# Patient Record
Sex: FEMALE | Race: WHITE | NOT HISPANIC OR LATINO | Employment: UNEMPLOYED | URBAN - METROPOLITAN AREA
[De-identification: names, ages, dates, MRNs, and addresses within clinical notes are randomized per-mention and may not be internally consistent; named-entity substitution may affect disease eponyms.]

---

## 2017-01-01 ENCOUNTER — HOSPITAL ENCOUNTER (INPATIENT)
Facility: HOSPITAL | Age: 62
LOS: 1 days | DRG: 296 | End: 2017-05-27
Attending: EMERGENCY MEDICINE | Admitting: INTERNAL MEDICINE
Payer: MEDICAID

## 2017-01-01 VITALS
TEMPERATURE: 97 F | RESPIRATION RATE: 14 BRPM | DIASTOLIC BLOOD PRESSURE: 63 MMHG | HEART RATE: 67 BPM | OXYGEN SATURATION: 100 % | HEIGHT: 63 IN | SYSTOLIC BLOOD PRESSURE: 137 MMHG | BODY MASS INDEX: 40.86 KG/M2 | WEIGHT: 230.63 LBS

## 2017-01-01 DIAGNOSIS — J18.9 PNEUMONIA DUE TO INFECTIOUS ORGANISM, UNSPECIFIED LATERALITY, UNSPECIFIED PART OF LUNG: ICD-10-CM

## 2017-01-01 DIAGNOSIS — I46.9 CARDIAC ARREST: Primary | ICD-10-CM

## 2017-01-01 DIAGNOSIS — N17.9 AKI (ACUTE KIDNEY INJURY): ICD-10-CM

## 2017-01-01 DIAGNOSIS — J96.22 ACUTE ON CHRONIC RESPIRATORY FAILURE WITH HYPERCAPNIA: ICD-10-CM

## 2017-01-01 DIAGNOSIS — J96.00 ACUTE RESPIRATORY FAILURE, UNSPECIFIED WHETHER WITH HYPOXIA OR HYPERCAPNIA: ICD-10-CM

## 2017-01-01 DIAGNOSIS — Z45.2 ENCOUNTER FOR CENTRAL LINE PLACEMENT: ICD-10-CM

## 2017-01-01 DIAGNOSIS — Z66 DNR (DO NOT RESUSCITATE): ICD-10-CM

## 2017-01-01 DIAGNOSIS — E87.20 METABOLIC ACIDOSIS: ICD-10-CM

## 2017-01-01 DIAGNOSIS — A41.9 SEPSIS, DUE TO UNSPECIFIED ORGANISM: ICD-10-CM

## 2017-01-01 DIAGNOSIS — R57.9 SHOCK: ICD-10-CM

## 2017-01-01 LAB
ABO + RH BLD: NORMAL
ALBUMIN SERPL BCP-MCNC: 1.9 G/DL
ALBUMIN SERPL BCP-MCNC: 2 G/DL
ALLENS TEST: ABNORMAL
ALP SERPL-CCNC: 276 U/L
ALP SERPL-CCNC: 333 U/L
ALT SERPL W/O P-5'-P-CCNC: 1999 U/L
ALT SERPL W/O P-5'-P-CCNC: 2187 U/L
AMORPH CRY UR QL COMP ASSIST: ABNORMAL
AMORPH CRY UR QL COMP ASSIST: ABNORMAL
ANION GAP SERPL CALC-SCNC: 29 MMOL/L
ANION GAP SERPL CALC-SCNC: 29 MMOL/L
ANISOCYTOSIS BLD QL SMEAR: SLIGHT
ANISOCYTOSIS BLD QL SMEAR: SLIGHT
APTT BLDCRRT: 30.1 SEC
AST SERPL-CCNC: 1604 U/L
AST SERPL-CCNC: 1886 U/L
B-OH-BUTYR BLD STRIP-SCNC: 0.4 MMOL/L
BACTERIA #/AREA URNS AUTO: ABNORMAL /HPF
BACTERIA #/AREA URNS AUTO: ABNORMAL /HPF
BASOPHILS # BLD AUTO: ABNORMAL K/UL
BASOPHILS NFR BLD: 0 %
BASOPHILS NFR BLD: 0.5 %
BILIRUB SERPL-MCNC: 0.7 MG/DL
BILIRUB SERPL-MCNC: 1 MG/DL
BILIRUB UR QL STRIP: NEGATIVE
BILIRUB UR QL STRIP: NEGATIVE
BLD GP AB SCN CELLS X3 SERPL QL: NORMAL
BLD PROD TYP BPU: NORMAL
BLOOD UNIT EXPIRATION DATE: NORMAL
BLOOD UNIT TYPE CODE: 8400
BLOOD UNIT TYPE: NORMAL
BNP SERPL-MCNC: 937 PG/ML
BUN SERPL-MCNC: 55 MG/DL
BUN SERPL-MCNC: 57 MG/DL
BURR CELLS BLD QL SMEAR: ABNORMAL
BURR CELLS BLD QL SMEAR: ABNORMAL
CALCIUM SERPL-MCNC: 8.3 MG/DL
CALCIUM SERPL-MCNC: 8.4 MG/DL
CAOX CRY UR QL COMP ASSIST: ABNORMAL
CAOX CRY UR QL COMP ASSIST: ABNORMAL
CHLORIDE SERPL-SCNC: 94 MMOL/L
CHLORIDE SERPL-SCNC: 96 MMOL/L
CLARITY UR REFRACT.AUTO: ABNORMAL
CLARITY UR REFRACT.AUTO: ABNORMAL
CO2 SERPL-SCNC: 10 MMOL/L
CO2 SERPL-SCNC: 11 MMOL/L
CODING SYSTEM: NORMAL
COLOR UR AUTO: ABNORMAL
COLOR UR AUTO: ABNORMAL
CREAT SERPL-MCNC: 2.8 MG/DL
CREAT SERPL-MCNC: 2.8 MG/DL
DELSYS: ABNORMAL
DIFFERENTIAL METHOD: ABNORMAL
DIFFERENTIAL METHOD: ABNORMAL
DISPENSE STATUS: NORMAL
EOSINOPHIL # BLD AUTO: ABNORMAL K/UL
EOSINOPHIL NFR BLD: 0 %
EOSINOPHIL NFR BLD: 0 %
ERYTHROCYTE [DISTWIDTH] IN BLOOD BY AUTOMATED COUNT: 16.9 %
ERYTHROCYTE [DISTWIDTH] IN BLOOD BY AUTOMATED COUNT: 16.9 %
ERYTHROCYTE [SEDIMENTATION RATE] IN BLOOD BY WESTERGREN METHOD: 24 MM/H
EST. GFR  (AFRICAN AMERICAN): 20.4 ML/MIN/1.73 M^2
EST. GFR  (AFRICAN AMERICAN): 20.4 ML/MIN/1.73 M^2
EST. GFR  (NON AFRICAN AMERICAN): 17.7 ML/MIN/1.73 M^2
EST. GFR  (NON AFRICAN AMERICAN): 17.7 ML/MIN/1.73 M^2
ESTIMATED AVG GLUCOSE: 166 MG/DL
FIO2: 60
GLUCOSE SERPL-MCNC: 377 MG/DL
GLUCOSE SERPL-MCNC: 411 MG/DL
GLUCOSE UR QL STRIP: NEGATIVE
GLUCOSE UR QL STRIP: NEGATIVE
HBA1C MFR BLD HPLC: 7.4 %
HCO3 UR-SCNC: 13.4 MMOL/L (ref 24–28)
HCT VFR BLD AUTO: 24 %
HCT VFR BLD AUTO: 24.1 %
HCT VFR BLD AUTO: 25.9 %
HGB BLD-MCNC: 6.9 G/DL
HGB BLD-MCNC: 6.9 G/DL
HGB BLD-MCNC: 7.6 G/DL
HGB UR QL STRIP: ABNORMAL
HGB UR QL STRIP: NEGATIVE
HYALINE CASTS UR QL AUTO: 21 /LPF
HYALINE CASTS UR QL AUTO: 26 /LPF
HYPOCHROMIA BLD QL SMEAR: ABNORMAL
INR PPP: 1.8
INR PPP: 2
KETONES UR QL STRIP: NEGATIVE
KETONES UR QL STRIP: NEGATIVE
LACTATE SERPL-SCNC: >12 MMOL/L
LACTATE SERPL-SCNC: >12 MMOL/L
LEUKOCYTE ESTERASE UR QL STRIP: ABNORMAL
LEUKOCYTE ESTERASE UR QL STRIP: ABNORMAL
LIPASE SERPL-CCNC: 33 U/L
LYMPHOCYTES # BLD AUTO: ABNORMAL K/UL
LYMPHOCYTES NFR BLD: 20 %
LYMPHOCYTES NFR BLD: 6 %
MAGNESIUM SERPL-MCNC: 1.9 MG/DL
MAGNESIUM SERPL-MCNC: 2 MG/DL
MCH RBC QN AUTO: 26.4 PG
MCH RBC QN AUTO: 26.5 PG
MCHC RBC AUTO-ENTMCNC: 28.6 %
MCHC RBC AUTO-ENTMCNC: 28.8 %
MCV RBC AUTO: 92 FL
MCV RBC AUTO: 93 FL
METAMYELOCYTES NFR BLD MANUAL: 1.5 %
MICROSCOPIC COMMENT: ABNORMAL
MICROSCOPIC COMMENT: ABNORMAL
MODE: ABNORMAL
MONOCYTES # BLD AUTO: ABNORMAL K/UL
MONOCYTES NFR BLD: 3 %
MONOCYTES NFR BLD: 7 %
MYELOCYTES NFR BLD MANUAL: 1.5 %
NEUTROPHILS NFR BLD: 76.5 %
NEUTROPHILS NFR BLD: 77 %
NEUTS BAND NFR BLD MANUAL: 7 %
NITRITE UR QL STRIP: NEGATIVE
NITRITE UR QL STRIP: NEGATIVE
NRBC BLD-RTO: ABNORMAL /100 WBC
NRBC BLD-RTO: ABNORMAL /100 WBC
OVALOCYTES BLD QL SMEAR: ABNORMAL
OVALOCYTES BLD QL SMEAR: ABNORMAL
PCO2 BLDA: 54.1 MMHG (ref 35–45)
PEEP: 10
PH SMN: 7 [PH] (ref 7.35–7.45)
PH UR STRIP: 5 [PH] (ref 5–8)
PH UR STRIP: 5 [PH] (ref 5–8)
PHOSPHATE SERPL-MCNC: 9.7 MG/DL
PHOSPHATE SERPL-MCNC: 9.9 MG/DL
PLATELET # BLD AUTO: 248 K/UL
PLATELET # BLD AUTO: 290 K/UL
PLATELET BLD QL SMEAR: ABNORMAL
PLATELET BLD QL SMEAR: ABNORMAL
PMV BLD AUTO: 10.5 FL
PMV BLD AUTO: 10.8 FL
PO2 BLDA: 50 MMHG (ref 40–60)
POC BE: -18 MMOL/L
POC SATURATED O2: 64 % (ref 95–100)
POC TCO2: 15 MMOL/L (ref 24–29)
POIKILOCYTOSIS BLD QL SMEAR: ABNORMAL
POIKILOCYTOSIS BLD QL SMEAR: SLIGHT
POLYCHROMASIA BLD QL SMEAR: ABNORMAL
POLYCHROMASIA BLD QL SMEAR: ABNORMAL
POTASSIUM SERPL-SCNC: 5.8 MMOL/L
POTASSIUM SERPL-SCNC: 5.8 MMOL/L
POTASSIUM SERPL-SCNC: 5.9 MMOL/L
PROCALCITONIN SERPL IA-MCNC: 1.36 NG/ML
PROT SERPL-MCNC: 5.4 G/DL
PROT SERPL-MCNC: 5.6 G/DL
PROT UR QL STRIP: ABNORMAL
PROT UR QL STRIP: NEGATIVE
PROTHROMBIN TIME: 18.5 SEC
PROTHROMBIN TIME: 19.8 SEC
RBC # BLD AUTO: 2.6 M/UL
RBC # BLD AUTO: 2.61 M/UL
RBC #/AREA URNS AUTO: 17 /HPF (ref 0–4)
RBC #/AREA URNS AUTO: 6 /HPF (ref 0–4)
SAMPLE: ABNORMAL
SITE: ABNORMAL
SODIUM SERPL-SCNC: 133 MMOL/L
SODIUM SERPL-SCNC: 136 MMOL/L
SP GR UR STRIP: 1.01 (ref 1–1.03)
SP GR UR STRIP: 1.01 (ref 1–1.03)
SQUAMOUS #/AREA URNS AUTO: 0 /HPF
SQUAMOUS #/AREA URNS AUTO: 0 /HPF
TRANS ERYTHROCYTES VOL PATIENT: NORMAL ML
TROPONIN I SERPL DL<=0.01 NG/ML-MCNC: 4.21 NG/ML
TSH SERPL DL<=0.005 MIU/L-ACNC: 2.08 UIU/ML
TSH SERPL DL<=0.005 MIU/L-ACNC: 2.47 UIU/ML
URN SPEC COLLECT METH UR: ABNORMAL
URN SPEC COLLECT METH UR: ABNORMAL
UROBILINOGEN UR STRIP-ACNC: NEGATIVE EU/DL
UROBILINOGEN UR STRIP-ACNC: NEGATIVE EU/DL
VANCOMYCIN SERPL-MCNC: 65.8 UG/ML
VT: 400
WBC # BLD AUTO: 25.61 K/UL
WBC # BLD AUTO: 29.06 K/UL
WBC #/AREA URNS AUTO: 12 /HPF (ref 0–5)
WBC #/AREA URNS AUTO: 45 /HPF (ref 0–5)
WBC CLUMPS UR QL AUTO: ABNORMAL
YEAST UR QL AUTO: ABNORMAL
YEAST UR QL AUTO: ABNORMAL

## 2017-01-01 PROCEDURE — 81003 URINALYSIS AUTO W/O SCOPE: CPT

## 2017-01-01 PROCEDURE — 84100 ASSAY OF PHOSPHORUS: CPT | Mod: 91

## 2017-01-01 PROCEDURE — 94640 AIRWAY INHALATION TREATMENT: CPT

## 2017-01-01 PROCEDURE — 85014 HEMATOCRIT: CPT

## 2017-01-01 PROCEDURE — 82010 KETONE BODYS QUAN: CPT

## 2017-01-01 PROCEDURE — 63600175 PHARM REV CODE 636 W HCPCS: Performed by: EMERGENCY MEDICINE

## 2017-01-01 PROCEDURE — 63600175 PHARM REV CODE 636 W HCPCS: Performed by: STUDENT IN AN ORGANIZED HEALTH CARE EDUCATION/TRAINING PROGRAM

## 2017-01-01 PROCEDURE — 25000242 PHARM REV CODE 250 ALT 637 W/ HCPCS: Performed by: STUDENT IN AN ORGANIZED HEALTH CARE EDUCATION/TRAINING PROGRAM

## 2017-01-01 PROCEDURE — 83690 ASSAY OF LIPASE: CPT

## 2017-01-01 PROCEDURE — 96366 THER/PROPH/DIAG IV INF ADDON: CPT | Mod: XS

## 2017-01-01 PROCEDURE — 99900026 HC AIRWAY MAINTENANCE (STAT)

## 2017-01-01 PROCEDURE — 83605 ASSAY OF LACTIC ACID: CPT | Mod: 91

## 2017-01-01 PROCEDURE — 84443 ASSAY THYROID STIM HORMONE: CPT | Mod: 91

## 2017-01-01 PROCEDURE — 84145 PROCALCITONIN (PCT): CPT

## 2017-01-01 PROCEDURE — 83735 ASSAY OF MAGNESIUM: CPT

## 2017-01-01 PROCEDURE — 36556 INSERT NON-TUNNEL CV CATH: CPT | Mod: ,,, | Performed by: EMERGENCY MEDICINE

## 2017-01-01 PROCEDURE — 87040 BLOOD CULTURE FOR BACTERIA: CPT | Mod: 59

## 2017-01-01 PROCEDURE — 36620 INSERTION CATHETER ARTERY: CPT | Mod: LT

## 2017-01-01 PROCEDURE — 86900 BLOOD TYPING SEROLOGIC ABO: CPT

## 2017-01-01 PROCEDURE — 36680 INSERT NEEDLE BONE CAVITY: CPT | Mod: LT

## 2017-01-01 PROCEDURE — 92950 HEART/LUNG RESUSCITATION CPR: CPT

## 2017-01-01 PROCEDURE — 99291 CRITICAL CARE FIRST HOUR: CPT | Mod: 25,,, | Performed by: EMERGENCY MEDICINE

## 2017-01-01 PROCEDURE — 85610 PROTHROMBIN TIME: CPT | Mod: 91

## 2017-01-01 PROCEDURE — P9021 RED BLOOD CELLS UNIT: HCPCS

## 2017-01-01 PROCEDURE — 81001 URINALYSIS AUTO W/SCOPE: CPT

## 2017-01-01 PROCEDURE — 25000003 PHARM REV CODE 250: Performed by: HOSPITALIST

## 2017-01-01 PROCEDURE — 63600175 PHARM REV CODE 636 W HCPCS: Performed by: HOSPITALIST

## 2017-01-01 PROCEDURE — 63600175 PHARM REV CODE 636 W HCPCS

## 2017-01-01 PROCEDURE — 36430 TRANSFUSION BLD/BLD COMPNT: CPT

## 2017-01-01 PROCEDURE — 99291 CRITICAL CARE FIRST HOUR: CPT | Mod: ,,, | Performed by: INTERNAL MEDICINE

## 2017-01-01 PROCEDURE — 82533 TOTAL CORTISOL: CPT

## 2017-01-01 PROCEDURE — 83880 ASSAY OF NATRIURETIC PEPTIDE: CPT

## 2017-01-01 PROCEDURE — 94003 VENT MGMT INPAT SUBQ DAY: CPT

## 2017-01-01 PROCEDURE — 85730 THROMBOPLASTIN TIME PARTIAL: CPT

## 2017-01-01 PROCEDURE — 25000003 PHARM REV CODE 250: Performed by: EMERGENCY MEDICINE

## 2017-01-01 PROCEDURE — 82803 BLOOD GASES ANY COMBINATION: CPT

## 2017-01-01 PROCEDURE — 87088 URINE BACTERIA CULTURE: CPT

## 2017-01-01 PROCEDURE — 85018 HEMOGLOBIN: CPT

## 2017-01-01 PROCEDURE — 85027 COMPLETE CBC AUTOMATED: CPT | Mod: 91

## 2017-01-01 PROCEDURE — 81001 URINALYSIS AUTO W/SCOPE: CPT | Mod: 91

## 2017-01-01 PROCEDURE — 83735 ASSAY OF MAGNESIUM: CPT | Mod: 91

## 2017-01-01 PROCEDURE — 83036 HEMOGLOBIN GLYCOSYLATED A1C: CPT

## 2017-01-01 PROCEDURE — 83605 ASSAY OF LACTIC ACID: CPT

## 2017-01-01 PROCEDURE — 80053 COMPREHEN METABOLIC PANEL: CPT | Mod: 91

## 2017-01-01 PROCEDURE — 94002 VENT MGMT INPAT INIT DAY: CPT

## 2017-01-01 PROCEDURE — 25000003 PHARM REV CODE 250

## 2017-01-01 PROCEDURE — 84100 ASSAY OF PHOSPHORUS: CPT

## 2017-01-01 PROCEDURE — 93010 ELECTROCARDIOGRAM REPORT: CPT | Mod: ,,, | Performed by: INTERNAL MEDICINE

## 2017-01-01 PROCEDURE — 87086 URINE CULTURE/COLONY COUNT: CPT

## 2017-01-01 PROCEDURE — 96368 THER/DIAG CONCURRENT INF: CPT | Mod: XS

## 2017-01-01 PROCEDURE — 96367 TX/PROPH/DG ADDL SEQ IV INF: CPT

## 2017-01-01 PROCEDURE — 80202 ASSAY OF VANCOMYCIN: CPT

## 2017-01-01 PROCEDURE — 85007 BL SMEAR W/DIFF WBC COUNT: CPT

## 2017-01-01 PROCEDURE — 36620 INSERTION CATHETER ARTERY: CPT | Mod: ,,, | Performed by: EMERGENCY MEDICINE

## 2017-01-01 PROCEDURE — 0BH18EZ INSERTION OF ENDOTRACHEAL AIRWAY INTO TRACHEA, VIA NATURAL OR ARTIFICIAL OPENING ENDOSCOPIC: ICD-10-PCS | Performed by: EMERGENCY MEDICINE

## 2017-01-01 PROCEDURE — 80053 COMPREHEN METABOLIC PANEL: CPT

## 2017-01-01 PROCEDURE — 84443 ASSAY THYROID STIM HORMONE: CPT

## 2017-01-01 PROCEDURE — 99900035 HC TECH TIME PER 15 MIN (STAT)

## 2017-01-01 PROCEDURE — 96365 THER/PROPH/DIAG IV INF INIT: CPT | Mod: XS

## 2017-01-01 PROCEDURE — 85610 PROTHROMBIN TIME: CPT

## 2017-01-01 PROCEDURE — 86901 BLOOD TYPING SEROLOGIC RH(D): CPT

## 2017-01-01 PROCEDURE — 31500 INSERT EMERGENCY AIRWAY: CPT

## 2017-01-01 PROCEDURE — 25000003 PHARM REV CODE 250: Performed by: STUDENT IN AN ORGANIZED HEALTH CARE EDUCATION/TRAINING PROGRAM

## 2017-01-01 PROCEDURE — 93005 ELECTROCARDIOGRAM TRACING: CPT

## 2017-01-01 PROCEDURE — 84484 ASSAY OF TROPONIN QUANT: CPT

## 2017-01-01 PROCEDURE — 87106 FUNGI IDENTIFICATION YEAST: CPT

## 2017-01-01 PROCEDURE — 36556 INSERT NON-TUNNEL CV CATH: CPT | Mod: RT

## 2017-01-01 PROCEDURE — 20000000 HC ICU ROOM

## 2017-01-01 PROCEDURE — 5A1935Z RESPIRATORY VENTILATION, LESS THAN 24 CONSECUTIVE HOURS: ICD-10-PCS | Performed by: EMERGENCY MEDICINE

## 2017-01-01 PROCEDURE — 86920 COMPATIBILITY TEST SPIN: CPT

## 2017-01-01 PROCEDURE — 31500 INSERT EMERGENCY AIRWAY: CPT | Mod: XS,,, | Performed by: EMERGENCY MEDICINE

## 2017-01-01 PROCEDURE — 99291 CRITICAL CARE FIRST HOUR: CPT | Mod: 25

## 2017-01-01 RX ORDER — NOREPINEPHRINE BITARTRATE/D5W 4MG/250ML
0.05 PLASTIC BAG, INJECTION (ML) INTRAVENOUS CONTINUOUS
Status: DISCONTINUED | OUTPATIENT
Start: 2017-01-01 | End: 2017-01-01

## 2017-01-01 RX ORDER — NOREPINEPHRINE BITARTRATE/D5W 4MG/250ML
PLASTIC BAG, INJECTION (ML) INTRAVENOUS
Status: COMPLETED
Start: 2017-01-01 | End: 2017-01-01

## 2017-01-01 RX ORDER — SODIUM CHLORIDE 9 MG/ML
INJECTION, SOLUTION INTRAVENOUS
Status: COMPLETED | OUTPATIENT
Start: 2017-01-01 | End: 2017-01-01

## 2017-01-01 RX ORDER — IPRATROPIUM BROMIDE AND ALBUTEROL SULFATE 2.5; .5 MG/3ML; MG/3ML
3 SOLUTION RESPIRATORY (INHALATION) EVERY 4 HOURS
Status: DISCONTINUED | OUTPATIENT
Start: 2017-01-01 | End: 2017-01-01

## 2017-01-01 RX ORDER — LORAZEPAM 0.5 MG/1
1 TABLET ORAL EVERY 30 MIN PRN
Status: DISCONTINUED | OUTPATIENT
Start: 2017-01-01 | End: 2017-01-01

## 2017-01-01 RX ORDER — CLOPIDOGREL BISULFATE 75 MG/1
75 TABLET ORAL DAILY
Status: DISCONTINUED | OUTPATIENT
Start: 2017-01-01 | End: 2017-01-01

## 2017-01-01 RX ORDER — MORPHINE SULFATE 2 MG/ML
5 INJECTION, SOLUTION INTRAMUSCULAR; INTRAVENOUS ONCE
Status: COMPLETED | OUTPATIENT
Start: 2017-01-01 | End: 2017-01-01

## 2017-01-01 RX ORDER — LORAZEPAM 2 MG/ML
INJECTION INTRAMUSCULAR
Status: COMPLETED
Start: 2017-01-01 | End: 2017-01-01

## 2017-01-01 RX ORDER — PANTOPRAZOLE SODIUM 40 MG/10ML
40 INJECTION, POWDER, LYOPHILIZED, FOR SOLUTION INTRAVENOUS DAILY
Status: DISCONTINUED | OUTPATIENT
Start: 2017-01-01 | End: 2017-01-01

## 2017-01-01 RX ORDER — SODIUM BICARBONATE 1 MEQ/ML
SYRINGE (ML) INTRAVENOUS CODE/TRAUMA/SEDATION MEDICATION
Status: COMPLETED | OUTPATIENT
Start: 2017-01-01 | End: 2017-01-01

## 2017-01-01 RX ORDER — NOREPINEPHRINE BITARTRATE/D5W 4MG/250ML
PLASTIC BAG, INJECTION (ML) INTRAVENOUS
Status: DISPENSED
Start: 2017-01-01 | End: 2017-01-01

## 2017-01-01 RX ORDER — CALCIUM CHLORIDE INJECTION 100 MG/ML
INJECTION, SOLUTION INTRAVENOUS CODE/TRAUMA/SEDATION MEDICATION
Status: COMPLETED | OUTPATIENT
Start: 2017-01-01 | End: 2017-01-01

## 2017-01-01 RX ORDER — LORAZEPAM 2 MG/ML
1 INJECTION INTRAMUSCULAR
Status: DISCONTINUED | OUTPATIENT
Start: 2017-01-01 | End: 2017-01-01 | Stop reason: HOSPADM

## 2017-01-01 RX ORDER — DEXTROSE MONOHYDRATE 25 G/50ML
INJECTION, SOLUTION INTRAVENOUS
Status: COMPLETED | OUTPATIENT
Start: 2017-01-01 | End: 2017-01-01

## 2017-01-01 RX ORDER — EPINEPHRINE 1 MG/ML
INJECTION INTRAMUSCULAR; INTRAVENOUS; SUBCUTANEOUS CODE/TRAUMA/SEDATION MEDICATION
Status: COMPLETED | OUTPATIENT
Start: 2017-01-01 | End: 2017-01-01

## 2017-01-01 RX ORDER — MOXIFLOXACIN HYDROCHLORIDE 400 MG/250ML
400 INJECTION, SOLUTION INTRAVENOUS
Status: DISCONTINUED | OUTPATIENT
Start: 2017-01-01 | End: 2017-01-01

## 2017-01-01 RX ORDER — SODIUM CHLORIDE 0.9 % (FLUSH) 0.9 %
3 SYRINGE (ML) INJECTION EVERY 8 HOURS
Status: DISCONTINUED | OUTPATIENT
Start: 2017-01-01 | End: 2017-01-01

## 2017-01-01 RX ORDER — HEPARIN SODIUM 5000 [USP'U]/ML
5000 INJECTION, SOLUTION INTRAVENOUS; SUBCUTANEOUS EVERY 8 HOURS
Status: DISCONTINUED | OUTPATIENT
Start: 2017-01-01 | End: 2017-01-01

## 2017-01-01 RX ORDER — SODIUM CHLORIDE 9 MG/ML
30 INJECTION, SOLUTION INTRAVENOUS
Status: COMPLETED | OUTPATIENT
Start: 2017-01-01 | End: 2017-01-01

## 2017-01-01 RX ORDER — MORPHINE SULFATE 2 MG/ML
5 INJECTION, SOLUTION INTRAMUSCULAR; INTRAVENOUS
Status: DISCONTINUED | OUTPATIENT
Start: 2017-01-01 | End: 2017-01-01 | Stop reason: HOSPADM

## 2017-01-01 RX ORDER — ATROPINE SULFATE 10 MG/ML
1 SOLUTION/ DROPS OPHTHALMIC 3 TIMES DAILY
Status: DISCONTINUED | OUTPATIENT
Start: 2017-01-01 | End: 2017-01-01 | Stop reason: HOSPADM

## 2017-01-01 RX ORDER — HYDROCODONE BITARTRATE AND ACETAMINOPHEN 500; 5 MG/1; MG/1
TABLET ORAL
Status: DISCONTINUED | OUTPATIENT
Start: 2017-01-01 | End: 2017-01-01 | Stop reason: HOSPADM

## 2017-01-01 RX ORDER — NALOXONE HCL 0.4 MG/ML
VIAL (ML) INJECTION CODE/TRAUMA/SEDATION MEDICATION
Status: COMPLETED | OUTPATIENT
Start: 2017-01-01 | End: 2017-01-01

## 2017-01-01 RX ADMIN — MOXIFLOXACIN HYDROCHLORIDE 400 MG: 400 INJECTION, SOLUTION INTRAVENOUS at 03:05

## 2017-01-01 RX ADMIN — HEPARIN SODIUM 5000 UNITS: 5000 INJECTION, SOLUTION INTRAVENOUS; SUBCUTANEOUS at 06:05

## 2017-01-01 RX ADMIN — EPINEPHRINE 1 MG: 1 INJECTION INTRAMUSCULAR; INTRAVENOUS; SUBCUTANEOUS at 12:05

## 2017-01-01 RX ADMIN — Medication 0.25 MCG/KG/MIN: at 05:05

## 2017-01-01 RX ADMIN — SODIUM CHLORIDE 3450 ML: 0.9 INJECTION, SOLUTION INTRAVENOUS at 01:05

## 2017-01-01 RX ADMIN — VANCOMYCIN HYDROCHLORIDE 2250 MG: 1 INJECTION, POWDER, LYOPHILIZED, FOR SOLUTION INTRAVENOUS at 02:05

## 2017-01-01 RX ADMIN — CEFTRIAXONE 2 G: 2 INJECTION, SOLUTION INTRAVENOUS at 02:05

## 2017-01-01 RX ADMIN — DEXTROSE MONOHYDRATE 50 ML: 25 INJECTION, SOLUTION INTRAVENOUS at 12:05

## 2017-01-01 RX ADMIN — AMIODARONE HYDROCHLORIDE 300 MG: 50 INJECTION, SOLUTION INTRAVENOUS at 12:05

## 2017-01-01 RX ADMIN — SODIUM BICARBONATE 50 MEQ: 84 INJECTION, SOLUTION INTRAVENOUS at 12:05

## 2017-01-01 RX ADMIN — Medication 3 ML: at 06:05

## 2017-01-01 RX ADMIN — SODIUM CHLORIDE 1000 ML: 0.9 INJECTION, SOLUTION INTRAVENOUS at 12:05

## 2017-01-01 RX ADMIN — AMIODARONE HYDROCHLORIDE 1 MG/MIN: 1.8 INJECTION, SOLUTION INTRAVENOUS at 03:05

## 2017-01-01 RX ADMIN — Medication 0.25 MCG/KG/MIN: at 03:05

## 2017-01-01 RX ADMIN — PIPERACILLIN AND TAZOBACTAM 4.5 G: 4; .5 INJECTION, POWDER, LYOPHILIZED, FOR SOLUTION INTRAVENOUS; PARENTERAL at 04:05

## 2017-01-01 RX ADMIN — NALOXONE HYDROCHLORIDE 2 MG: 0.4 INJECTION, SOLUTION INTRAMUSCULAR; INTRAVENOUS; SUBCUTANEOUS at 12:05

## 2017-01-01 RX ADMIN — LORAZEPAM 1 MG: 2 INJECTION INTRAMUSCULAR at 10:05

## 2017-01-01 RX ADMIN — IPRATROPIUM BROMIDE AND ALBUTEROL SULFATE 3 ML: .5; 3 SOLUTION RESPIRATORY (INHALATION) at 08:05

## 2017-01-01 RX ADMIN — CALCIUM CHLORIDE 1 G: 100 INJECTION, SOLUTION INTRAVENOUS at 12:05

## 2017-01-01 RX ADMIN — MORPHINE SULFATE 5 MG: 2 INJECTION, SOLUTION INTRAMUSCULAR; INTRAVENOUS at 10:05

## 2017-01-01 RX ADMIN — LORAZEPAM 1 MG: 2 INJECTION INTRAMUSCULAR; INTRAVENOUS at 10:05

## 2017-02-28 PROBLEM — E86.9 VOLUME DEPLETION: Status: ACTIVE | Noted: 2017-01-01

## 2017-02-28 PROBLEM — E87.5 HYPERKALEMIA: Status: ACTIVE | Noted: 2017-01-01

## 2017-02-28 PROBLEM — R73.9 HYPERGLYCEMIA: Status: ACTIVE | Noted: 2017-01-01

## 2017-02-28 PROBLEM — I25.10 CORONARY ARTERY DISEASE INVOLVING NATIVE CORONARY ARTERY WITHOUT ANGINA PECTORIS: Status: ACTIVE | Noted: 2017-01-01

## 2017-02-28 PROBLEM — Z72.0 TOBACCO ABUSE: Status: ACTIVE | Noted: 2017-01-01

## 2017-02-28 PROBLEM — R55 SYNCOPE: Status: ACTIVE | Noted: 2017-01-01

## 2017-03-01 PROBLEM — Z86.73 HISTORY OF STROKE: Status: ACTIVE | Noted: 2017-01-01

## 2017-03-02 PROBLEM — J96.22 ACUTE ON CHRONIC RESPIRATORY FAILURE WITH HYPERCAPNIA: Status: ACTIVE | Noted: 2017-01-01

## 2017-03-08 PROBLEM — I25.9 ISCHEMIC HEART DISEASE DUE TO CORONARY ARTERY OBSTRUCTION: Status: ACTIVE | Noted: 2017-01-01

## 2017-03-08 PROBLEM — I24.0 ISCHEMIC HEART DISEASE DUE TO CORONARY ARTERY OBSTRUCTION: Status: ACTIVE | Noted: 2017-01-01

## 2017-03-09 PROBLEM — E87.5 HYPERKALEMIA: Status: RESOLVED | Noted: 2017-01-01 | Resolved: 2017-01-01

## 2017-03-09 PROBLEM — E86.9 VOLUME DEPLETION: Status: RESOLVED | Noted: 2017-01-01 | Resolved: 2017-01-01

## 2017-03-20 PROBLEM — L03.116 CELLULITIS OF LEFT LEG: Status: ACTIVE | Noted: 2017-01-01

## 2017-03-20 PROBLEM — E87.6 HYPOKALEMIA: Status: ACTIVE | Noted: 2017-01-01

## 2017-03-20 PROBLEM — L03.116 CELLULITIS OF LEFT LOWER LEG: Status: ACTIVE | Noted: 2017-01-01

## 2017-03-20 PROBLEM — S31.000A SACRAL WOUND: Status: ACTIVE | Noted: 2017-01-01

## 2017-03-24 PROBLEM — R60.0 EDEMA EXTREMITIES: Status: ACTIVE | Noted: 2017-01-01

## 2017-03-29 PROBLEM — R19.8 POSITIVE MURPHY'S SIGN: Status: ACTIVE | Noted: 2017-01-01

## 2017-03-29 PROBLEM — I73.9 PERIPHERAL ARTERY DISEASE: Status: ACTIVE | Noted: 2017-01-01

## 2017-04-01 PROBLEM — I73.9 PERIPHERAL ARTERY DISEASE: Status: ACTIVE | Noted: 2017-01-01

## 2017-04-01 PROBLEM — L03.116 CELLULITIS OF LEFT LEG: Status: ACTIVE | Noted: 2017-01-01

## 2017-04-05 PROBLEM — J96.20 ACUTE ON CHRONIC RESPIRATORY FAILURE: Status: ACTIVE | Noted: 2017-01-01

## 2017-04-08 PROBLEM — F41.1 GENERALIZED ANXIETY DISORDER: Status: ACTIVE | Noted: 2017-01-01

## 2017-04-08 PROBLEM — F41.0 PANIC ATTACKS: Status: ACTIVE | Noted: 2017-01-01

## 2017-05-26 PROBLEM — A41.9 SEPSIS: Status: ACTIVE | Noted: 2017-01-01

## 2017-05-27 PROBLEM — R57.9 SHOCK: Status: ACTIVE | Noted: 2017-01-01

## 2017-05-27 PROBLEM — D64.9 ANEMIA: Status: ACTIVE | Noted: 2017-01-01

## 2017-05-27 PROBLEM — J18.9 PNEUMONIA: Status: ACTIVE | Noted: 2017-01-01

## 2017-05-27 PROBLEM — K72.00 SHOCK LIVER: Status: ACTIVE | Noted: 2017-01-01

## 2017-05-27 PROBLEM — N17.9 AKI (ACUTE KIDNEY INJURY): Status: ACTIVE | Noted: 2017-01-01

## 2017-05-27 PROBLEM — E87.20 METABOLIC ACIDOSIS: Status: ACTIVE | Noted: 2017-01-01

## 2017-05-27 PROBLEM — I46.9 CARDIAC ARREST: Status: ACTIVE | Noted: 2017-01-01

## 2017-05-27 PROBLEM — Z66 DNR (DO NOT RESUSCITATE): Status: ACTIVE | Noted: 2017-01-01

## 2017-05-27 NOTE — ASSESSMENT & PLAN NOTE
-WBC is 29.06, afebrile, but with a lactate of >12  -BCx, UCx and Resp is pending  -CXR shows diffuse hazy airspace opacities and prominent pulmonary vasculature suggestive of pulmonary edema, pneumonia, or ARDS  -have started patient on Vanc, Zosyn and Moxi  -nebs scheduled q4h

## 2017-05-27 NOTE — PROGRESS NOTES
Notified Dr. Conley of lactic acid greater than 12 and potassium 5.8. No shift right now. Will continue to monitor the patient.

## 2017-05-27 NOTE — ASSESSMENT & PLAN NOTE
-WBC was 29.06, afebrile, but with a lactate of >12  -CXR showed diffuse hazy airspace opacities and prominent pulmonary vasculature suggestive of pulmonary edema, pneumonia, or ARDS  -have started patient on Vanc, Zosyn and Moxi but discontinued when patients daughter expressed desire to make patient DNR and initiate comfort care

## 2017-05-27 NOTE — SIGNIFICANT EVENT
Death Note  Critical Care Medicine      Admit Date: 2017    Date of Death: 2017    Time of Death: 10:12    Attending Physician: Lc Mae*    Principal Diagnoses: Cardiac arrest    Preliminary Cause of Death: Cardiac arrest    Secondary Diagnoses:   Active Hospital Problems    Diagnosis  POA    *Cardiac arrest [I46.9]  Unknown    Pneumonia [J18.9]  Unknown    Anemia [D64.9]  Unknown    Shock liver [K72.00]  Unknown    Acute on chronic respiratory failure with hypercapnia [J96.22]  Yes    Coronary artery disease involving native coronary artery without angina pectoris [I25.10]  Yes    Hyperkalemia [E87.5]  Yes    COPD (chronic obstructive pulmonary disease) [J44.9]  Yes     Chronic    Type 2 diabetes mellitus [E11.9]  Yes     Chronic      Resolved Hospital Problems    Diagnosis Date Resolved POA   No resolved problems to display.        Discharged Condition:     HPI:  Ms. Moore is a 61 year old F with a PMHx of CAD (with stents in LAD, 3/2017), previous Impella placement, Afib (on Xeralto) COPD (on Breo and Spiriva, and Home Oxygen), T2DM (on insulin) who [place] [facility] [place]rest.     Patient was previously admitted to [facility] system from 3/20 - 4/10 for LLE cellulutis (R ankle).  She was started on Vanc and Zosyn, cultures grew MRSA so zosyn was stopped and patient was eventually transitioned to Doxycycline.  Patient also developed acute respiratory failure during this hospital stay, due to pulm edema.  She had a R thoracentesis (transudative) and was diuresed.  She also had an angiogram, which showed PVD.     We currently have no history between 4/10-present (unable to get in touch with family at the moment).  Patient was being transferred to [place] for management of pneumonia. On route, she became obtunded and subsequently developed PEA.     Hospital/ICU Course:     She [place] [facility] [place] at which time she was coded for 30 minutes, had 2 shocks, 6 rounds  of Epi, 1 amiodorone, 1 dextrose, 1 bicarb and 1 calcium.  She had a L femoral venous catheter placed, R femoral triple lumen, IO L tibia and was intubated.  Patient was subsequently transferred to Critical Care medicine.        Pts daughter stated that mother would want to be DNR and has expressed in the past her desire to not be intubated.  Daughter states that her mother would not want dialysis.  At this point,  daughter has communicated that her mother would want to have comfort care initiated as she has had a long gómez with various health conditions.     Transition to comfort care initiated. At the direction of the family, the patient was extubated  and measures to ensure the comfort of the patient including,  morphine as needed for pain and air hunger as well as benzodiazepines as needed for agitation. The patient was subsequently declared dead at 10:12    Time of death: 10:12  Cause of death: Cardiac Arrest

## 2017-05-27 NOTE — DISCHARGE SUMMARY
[facility]  Critical Care Medicine  Discharge Summary      Patient Name: Wayne Moore  MRN: [id number]  Admission Date: 5/27/2017  Hospital Length of Stay: 0 days  Discharge Date and Time:  05/27/2017 10:17 AM  Attending Physician: Lc Mae*   Discharging Provider: Yvette Leiva MD  Primary Care Provider: Ehsan Ortiz MD  Reason for Admission: Cardiac Arrest    HPI:   Ms. Moore is a 61 year old F with a PMHx of CAD (with stents in LAD, 3/2017), previous Empella placement, Afib (on Xerealto) COPD (on Breo and Spiriva, and Home Oxygen), T2DM (on insulin) who [place] [facility] [place] with Cardiac Arrest.    Patient was previously admitted to [facility] system from 3/20 - 4/10 for LLE cellulutis (R ankle).  She was started on Vanc and Zosyn, cultures grew MRSA so zosyn was stopped and patient was eventually transitioned to Doxycycline.  Patient also developed acute respiratory failure during this hospital stay, due to pulm edema.  She had a R thoracentesis (transudative) and was diuresed.  She also had an angiogram, which showed PVD.    We currently have no history between 4/10-present (unable to get in touch with family at the moment).  Patient was being transferred to [place] for management of pneumonia. On route, she became obtunded and subsequently developed PEA. She arrived at [facility] ED, at which time she was coded for 30 minutes, had 2 shocks, 6 rounds of Epi, 1 amiodorone, 1 dextrose, 1 bicarb and 1 calcium.  She had a L femoral venous catheter placed, R femoral triple lumen, IO L tibia and was intubated.  Patient was subsequently transferred to Critical Care medicine.        * No surgery found *    Indwelling Lines/Drains at Time of Discharge:   Lines/Drains/Airways     Central Venous Catheter Line                 Percutaneous Central Line Insertion/Assessment - double lumen  05/27/17 0050 left femoral vein less than 1 day         Percutaneous Central Line  Insertion/Assessment - triple lumen  05/27/17 0326 right internal jugular less than 1 day          Drain                 NG/OG Tube 05/27/17 0100 [place] sump Left nostril less than 1 day         Urethral Catheter 05/27/17 0035 Non-latex 16 Fr. less than 1 day          Airway                 Airway - Non-Surgical 05/27/17 0034 Endotracheal Tube less than 1 day              Hospital Course:   Transition to comfort care initiated. At the direction of the family, the patient was extubated  and measures to ensure the comfort of the patient including,  morphine as needed for pain and air hunger as well as benzodiazepines as needed for agitation. The patient was subsequently declared dead at 10:10    Consults         Status Ordering Provider     Inpatient consult to Intensivist (Critical Care)  Once     Provider:  (Not yet assigned)    Acknowledged LAURA ALBERTS        Significant Labs:  ABGs:     Recent Labs  Lab 05/27/17 0322   PH 7.003*   PCO2 54.1*   HCO3 13.4*   POCSATURATED 64*   BE -18     CMP:     Recent Labs  Lab 05/27/17 0141 05/27/17 0327    133*   K 5.9* 5.8*  5.8*   CL 96 94*   CO2 11* 10*   * 411*   BUN 55* 57*   CREATININE 2.8* 2.8*   CALCIUM 8.3* 8.4*   PROT 5.4* 5.6*   ALBUMIN 1.9* 2.0*   BILITOT 0.7 1.0   ALKPHOS 276* 333*   AST 1,604* 1,886*   ALT 1,999* 2,187*   ANIONGAP 29* 29*   EGFRNONAA 17.7* 17.7*     Lactic Acid:     Recent Labs  Lab 05/27/17 0141 05/27/17  0445   LACTATE >12.0* >12.0*       Significant Imaging:  I have reviewed all pertinent imaging results/findings within the past 24 hours.    Pending Diagnostic Studies:     Procedure Component Value Units Date/Time    CBC auto differential [[id number]] Collected:  05/27/17 0814    Order Status:  Sent Lab Status:  In process Updated:  05/27/17 0816    Specimen:  Blood from Blood     CBC auto differential [[id number]] Collected:  05/27/17 0700    Order Status:  Sent Lab Status:  In process Updated:  05/27/17 0701    Specimen:   Blood from Blood     Comprehensive metabolic panel [[id number]] Collected:  05/27/17 0814    Order Status:  Sent Lab Status:  In process Updated:  05/27/17 0816    Specimen:  Blood from Blood     Cortisol [[id number]] Collected:  05/27/17 0141    Order Status:  Sent Lab Status:  In process Updated:  05/27/17 0200    Specimen:  Blood from Blood     Hematocrit [[id number]] Collected:  05/27/17 0814    Order Status:  Sent Lab Status:  In process Updated:  05/27/17 0816    Specimen:  Blood from Blood     Hemoglobin [[id number]] Collected:  05/27/17 0814    Order Status:  Sent Lab Status:  In process Updated:  05/27/17 0816    Specimen:  Blood from Blood     Lactic acid, plasma #3 [[id number]] Collected:  05/27/17 0814    Order Status:  Sent Lab Status:  In process Updated:  05/27/17 0816    Specimen:  Blood from Blood     VANCOMYCIN, TROUGH before 4th dose [[id number]] Collected:  05/27/17 0626    Order Status:  Sent Lab Status:  In process Updated:  05/27/17 0626    Specimen:  Blood from Blood     Narrative:       Collection Instructions:->before 4th dose        Final Active Diagnoses:    Diagnosis Date Noted POA    PRINCIPAL PROBLEM:  Cardiac arrest [I46.9] 05/27/2017 Yes    Pneumonia [J18.9] 05/27/2017 Yes    Anemia [D64.9] 05/27/2017 Yes    Shock liver [K72.00] 05/27/2017 Yes    HORTENCIA (acute kidney injury) [N17.9] 05/27/2017 Yes    Shock [R57.9] 05/27/2017 Yes    DNR (do not resuscitate) [Z66] 05/27/2017 Yes    Metabolic acidosis [E87.2] 05/27/2017 Yes    Acute on chronic respiratory failure with hypercapnia [J96.22] 03/02/2017 Yes    Coronary artery disease involving native coronary artery without angina pectoris [I25.10] 02/28/2017 Yes    Hyperkalemia [E87.5] 02/28/2017 Yes    COPD (chronic obstructive pulmonary disease) [J44.9] 03/01/2016 Yes     Chronic    Type 2 diabetes mellitus [E11.9] 02/29/2016 Yes     Chronic      Problems Resolved During this Admission:    Diagnosis Date Noted Date Resolved POA      No new Assessment & Plan notes have been filed under this hospital service since the last note was generated.  Service: Critical Care Medicine    Discharged Condition:     Disposition:  in Medical Facil*      Patient Instructions:   No discharge procedures on file.  Medications:  None (patient  at medical facility)     Yvette Leiva MD  Critical Care Medicine  [facility]

## 2017-05-27 NOTE — ASSESSMENT & PLAN NOTE
-patient is intubated and NPO  -Bg was 301  -will get beta hydroxybutyrate  -will start patient on insulin drip

## 2017-05-27 NOTE — ASSESSMENT & PLAN NOTE
-patient is intubated and NPO  -Bg was 301  -insulin drip was initiated, but daughter wishes to initiate comfort care

## 2017-05-27 NOTE — CONSULTS
[facility]  Critical Care Medicine  Consult Note    Patient Name: Wayne Moore  MRN: [id number]  Admission Date: 5/27/2017  Hospital Length of Stay: 0 days  Code Status: DNR  Attending Physician: Fermin Rodarte MD   Primary Care Provider: Ehsan Ortiz MD   Principal Problem: <principal problem not specified>    Consults  Subjective:     HPI:  Ms. Moore is a 61 year old F with a PMHx of CAD (with stents in LAD, 3/2017), previous Impella placement, Afib (on Xeralto) COPD (on Breo and Spiriva, and Home Oxygen), T2DM (on insulin) who [place] [facility] [place].    Patient was previously admitted to [facility] from 3/20 - 4/10 for LLE cellulutis (R ankle).  She was started on Vanc and Zosyn, cultures grew MRSA so zosyn was stopped and patient was eventually transitioned to Doxycycline.  Patient also developed acute respiratory failure during this hospital stay, due to pulm edema.  She had a R thoracentesis (transudative) and was diuresed.  She also had an angiogram, which showed PVD.    We currently have no history between 4/10-present (unable to get in touch with family at the moment).  Patient was being transferred to [place] for management of pneumonia. On route, she became obtunded and subsequently developed PEA. She [place] [facility] [place] at which time she was coded for 30 minutes, had 2 shocks, 6 rounds of Epi, 1 amiodorone, 1 dextrose, 1 bicarb and 1 calcium.  She had a L femoral venous catheter placed, R femoral triple lumen, IO L tibia and was intubated.  Patient was subsequently transferred to Critical Care medicine.        Hospital/ICU Course:  No notes on file    Past Medical History:   Diagnosis Date    Anticoagulant long-term use     CHF (congestive heart failure)     COPD (chronic obstructive pulmonary disease)     Coronary artery disease     Diabetes mellitus     GERD (gastroesophageal reflux disease)     Hyperlipidemia     Hypertension     MI  (myocardial infarction)     PAD (peripheral artery disease)     Stroke        Past Surgical History:   Procedure Laterality Date    CARDIAC PACEMAKER PLACEMENT      EYE SURGERY      TONSILLECTOMY      TUBAL LIGATION         Review of patient's allergies indicates:   Allergen Reactions    Adhesive tape-silicones     Celebrex [celecoxib]     Erythromycin     Statins-hmg-coa reductase inhibitors Other (See Comments)     Muscle weakness    Sulfa (sulfonamide antibiotics)     Symbicort [budesonide-formoterol] Nausea And Vomiting    Toradol [ketorolac]     Ultram [tramadol]        Family History     None        Social History Main Topics    Smoking status: Former Smoker    Smokeless tobacco: Not on file    Alcohol use No    Drug use: No    Sexual activity: Not on file      Review of Systems   Unable to perform ROS: Intubated     Objective:     Vital Signs (Most Recent):  Pulse: 67 (05/27/17 0137)  Resp: 11 (05/27/17 0137)  BP: (!) 101/46 (05/27/17 0146)  SpO2: 100 % (05/27/17 0137) Vital Signs (24h Range):  Pulse:  [40-86] 67  Resp:  [] 11  SpO2:  [95 %-100 %] 100 %  BP: (101-198)/(42-98) 101/46   Weight: 115 kg (253 lb 8.5 oz)  Body mass index is 46.37 kg/m².    No intake or output data in the 24 hours ending 05/27/17 0249    Physical Exam   Constitutional:   Patient intubated, with R femoral triple lumen, R femoral venous cath, IO L tibia in place.     HENT:   Head: Normocephalic and atraumatic.   Eyes: Pupils are equal, round, and reactive to light. Right eye exhibits no discharge. Left eye exhibits no discharge.   Neck: Normal range of motion. No JVD present.   Cardiovascular: Normal rate.  Exam reveals no friction rub.    No murmur heard.  Pulmonary/Chest: She has no wheezes.   Patient intubated, on AC, Vt 500, PEEP 5. FiO2=40,   Abdominal: Soft. She exhibits no distension and no mass.   Musculoskeletal: She exhibits edema. She exhibits no deformity.   IO in place L tibia   Neurological:    Patient is intubated without sedation.  Patient withdrew to pain, had pupillary response and possible positive tracheal reflex.    Skin: No rash noted.   ecchymosis throughout abdomen       Vents:  Vent Mode: A/C (05/27/17 0112)  Ventilator Initiated: Yes (05/27/17 0112)  Set Rate: 20 bmp (05/27/17 0112)  Vt Set: 500 mL (05/27/17 0112)  Pressure Support: 0 cmH20 (05/27/17 0112)  PEEP/CPAP: 5 cmH20 (05/27/17 0112)  Oxygen Concentration (%): 100 (05/27/17 0112)  Peak Airway Pressure: 46 cmH2O (05/27/17 0112)  Total Ve: 11.7 mL (05/27/17 0112)  Lines/Drains/Airways     Airway                 Airway - Non-Surgical 05/27/17 0030 Endotracheal Tube less than 1 day         Airway - Non-Surgical 05/27/17 0034 Endotracheal Tube less than 1 day          Peripheral Intravenous Line                 Peripheral IV - Single Lumen 04/09/17 1815 Left Forearm 47 days              Significant Labs:    CBC/Anemia Profile:    Recent Labs  Lab 05/27/17 0141   WBC 29.06*   HGB 6.9*   HCT 24.1*      MCV 93   RDW 16.9*        Chemistries:    Recent Labs  Lab 05/27/17 0141      K 5.9*   CL 96   CO2 11*   BUN 55*   CREATININE 2.8*   CALCIUM 8.3*   ALBUMIN 1.9*   PROT 5.4*   BILITOT 0.7   ALKPHOS 276*   ALT 1,999*   AST 1,604*   MG 1.9   PHOS 9.9*       ABGs: No results for input(s): PH, PCO2, HCO3, POCSATURATED, BE in the last 48 hours.  Lactic Acid:   Recent Labs  Lab 05/27/17 0141   LACTATE >12.0*       Significant Imaging: CXR: I have reviewed all pertinent results/findings within the past 24 hours and my personal findings are:  Diffuse hazy airspace opacities and prominent pulmonary vasculature suggestive of pulmonary edema, pneumonia, or ARDS.  CT head is pending    Assessment/Plan:     Pulmonary   Pneumonia    -WBC is 29.06, afebrile, but with a lactate of >12  -BCx, UCx and Resp is pending  -CXR shows diffuse hazy airspace opacities and prominent pulmonary vasculature suggestive of pulmonary edema, pneumonia, or  ARDS  -have started patient on Vanc, Zosyn and Moxi  -nebs scheduled q4h          Acute on chronic respiratory failure with hypercapnia    -most likely 2/2 to PNA, but other ddx include ARDS vs pulmonary edema          Cardiac   Cardiac arrest    -PEA, may be 2/2 to hypoxemia vs hyperkalemia vs acidosis vs MI   -on exam, patient is withdrawing from pain, has reactive pupils and has a gag reflex  -spoke with pts Daughter, who expressed pts wishes to be made DNR;   -will get 2d ECHO in the AM; in mean time will attempt a bedside US of heart  -patient is currently on Levo and Amio infusion  -will f/u on pts labs         Coronary artery disease involving native coronary artery without angina pectoris    -will continue patient on clopidogrel 75mg        Endocrine   Type 2 diabetes mellitus    -patient is intubated and NPO  -Bg was 301  -will get beta hydroxybutyrate  -will start patient on insulin drip        Fluids/Electrolytes/Nutrition/GI   Shock liver    -ALT and AST is 1999; 1604 respectively  -will continue to trend and monitor with CMPs q4h        Hyperkalemia    -K is 5.9  -starting patient on insulin drip  -will monitor CMP q4h  -will ensure that pt's electrolytes are trending in the right dirrection  -if K is not trending down, and patient does not make urine, will need to consider dialysis        Other   Anemia    -H/H os 6.9  -will transfuse 1U of blood and continue to monitor H/H q4h             Critical Care Medicine Daily Checklist:    A: Awake: RASS Goal/Actual Goal:    Actual:     B: Spontaneous Breathing Trial Performed?     C: SAT & SBT Coordinated?  no                      D: Delirium: CAM-ICU     E: Early Mobility Performed? No   F: Feeding Goal:    Status:     Current Diet Order   Procedures    Diet NPO      AS: Analgesia/Sedation no   T: Thromboembolic Prophylaxis Heparin 5000U   H: HOB > 300 Yes   U: Stress Ulcer Prophylaxis (if needed) pantoprazole   G: Glucose Control Insulin infusion   B:  Bowel Function     I: Indwelling Catheter (Lines & Morgan) Necessity Femoral line, PIV, intubated   D: De-escalation of Antimicrobials/Pharmacotherapies Vanc, Zosyn and Moxi    Plan for the day/ETD Admit to critical care    Code Status:  Family/Goals of Care: DNR         Critical secondary to Patient has a condition that poses threat to life and bodily function: PEA arrest     Critical care was time spent personally by me on the following activities: development of treatment plan with patient or surrogate and bedside caregivers, discussions with consultants, evaluation of patient's response to treatment, examination of patient, ordering and performing treatments and interventions, ordering and review of laboratory studies, ordering and review of radiographic studies, pulse oximetry, re-evaluation of patient's condition. This critical care time did not overlap with that of any other provider or involve time for any procedures.    Thank you for your consult. We will be admitting patient to Critical Care Medicine.      Mentored by Dr. Platt and Dr. Jarvis Leiva MD  Critical Care Medicine  [facility]

## 2017-05-27 NOTE — H&P
Please see Consult Note dated 5/27/17 for full H&P    Jackie Leiva MD  Internal Medicine, PGY1  Pager [phone]

## 2017-05-27 NOTE — PROVIDER PROGRESS NOTES - EMERGENCY DEPT.
Encounter Date: 5/27/2017    ED Physician Progress Notes        Physician Note:   Procedure: Intraosseus catheter  Inserted by me in the usual fashion w/ EZ IO drill  Emergent, placed in left tibia using landmarks but with significant peripheral edema  Flushed well  No immediate complications    Jeffery Mendoza MD

## 2017-05-27 NOTE — PLAN OF CARE
Daughter Gertrude at Bedside - Had long discussion with her in presence of nurse Kylie.     Given patient's wishes, now multi-organ failure with anuria and shock liver given her already existing co-morbidities carry very high mortality. She would like to proceed with a withdrawal of care. She will notify TANYA Espinal when family ready to palliatively extubate. If patient was to survive the day, will transition to hospice.     Helen Bunn  Pulmonary Critical Care fellow.   [facility].   Cell:[phone]

## 2017-05-27 NOTE — PROGRESS NOTES
Patient admitted to SICU 6083 on vent by ED RN and RRT. Hooked up to ventilator and SICU monitors. VSS at this time.     Skin note: Skin breakdown on both ankles. 2kri0vs area of breakdown on right lateral ankle. Dsg in place on left tibia from IO. Breakdown on right lateral lower leg. Breakdown on right anterior shin near knee. Charge RN at the bedside. CC resident notified of patient's arrival.

## 2017-05-27 NOTE — ASSESSMENT & PLAN NOTE
-PEA, may be 2/2 to hypoxemia vs hyperkalemia vs acidosis vs MI   -on exam, patient was withdrawing from pain, had reactive pupils and had a gag reflex  -spoke with pts Daughter, who expressed pts wishes to be made DNR;   -patient is currently on Levo and Amio infusion but stopped when daughter expressed desire to withdrawal care  -all comfort care measures were initiated; patient was placed on morphine and benzos  -patient subsequently  at 1012

## 2017-05-27 NOTE — ED NOTES
PT ARRIVED TO  ED VIA EMS. CPR INITIATED. PT ORALLY INTUBATED VIA 7.5MM ETT SECURED AT 25 CM AMRK VIA COMMERCIAL ETT HAMMER. PT PLACED ON  VENTILATOR AT SETTINGS AS CHARTED POST ROSC. MD AT BEDSIDE. BBS LOUD AND EQUAL. PT TOLERATED WELL WITH NO ADVERSE REACTIONS NOTED.

## 2017-05-27 NOTE — ED PROVIDER NOTES
Encounter Date: 5/27/2017    SCRIBE #1 NOTE: I, Krista Suarez, am scribing for, and in the presence of,  Dr. Rodarte. I have scribed the entire note.       History     Chief Complaint   Patient presents with    Cardiac Arrest     pt was being transferred to [facility] when pt had a cardiac arrest in route. pt was being admitted hypokalemia, pneumonis, sepsis, NSTEMI     Review of patient's allergies indicates:   Allergen Reactions    Adhesive tape-silicones     Celebrex [celecoxib]     Erythromycin     Statins-hmg-coa reductase inhibitors Other (See Comments)     Muscle weakness    Sulfa (sulfonamide antibiotics)     Symbicort [budesonide-formoterol] Nausea And Vomiting    Toradol [ketorolac]     Ultram [tramadol]      The pt is a 61 y.o. female that presents unresponsive and in cardiac arrest. According to EMS report and documentation in transfer paperwork, pt was originally supposed to be transfered to [facility] for direct admission for PNA and sepsis. For unknown reasons at this time, pt instead arrived to our hospital. Upon arrival, EMS was bagging the pt and she was unresponsive.       The history is provided by the EMS personnel. The history is limited by the condition of the patient.     Past Medical History:   Diagnosis Date    Anticoagulant long-term use     CHF (congestive heart failure)     COPD (chronic obstructive pulmonary disease)     Coronary artery disease     Diabetes mellitus     GERD (gastroesophageal reflux disease)     Hyperlipidemia     Hypertension     MI (myocardial infarction)     PAD (peripheral artery disease)     Stroke      Past Surgical History:   Procedure Laterality Date    CARDIAC PACEMAKER PLACEMENT      EYE SURGERY      TONSILLECTOMY      TUBAL LIGATION       No family history on file.  Social History   Substance Use Topics    Smoking status: Former Smoker    Smokeless tobacco: Not on file    Alcohol use No     Review of Systems   Unable to  perform ROS: Patient unresponsive       Physical Exam     Initial Vitals   BP Pulse Resp Temp SpO2   -- -- -- -- --     Physical Exam    Nursing note and vitals reviewed.  Constitutional:   Unresponsive   HENT:   Head: Normocephalic and atraumatic.   Neck: Neck supple.   Cardiovascular:   Pulseless. Good palpable pulses with adequate compressions   Pulmonary/Chest:   Coarse breath sounds bilaterally with ventilation via BVM and ultimately endotracheal tube and mechanical ventilation.   Abdominal: Soft. She exhibits distension.   Numerous ecchymoses present on the midline lower abdomen   Musculoskeletal:   No obvious deformities   Neurological: GCS eye subscore is 1. GCS verbal subscore is 1. GCS motor subscore is 1.   GCS 3   Skin:   Extremities are pale         ED Course   Intubation  Date/Time: 5/27/2017 2:14 AM  Performed by: PHIL BETH  Authorized by: LAURA ALBERTS   Consent Done: Emergent Situation  Indications: respiratory failure  Intubation method: direct  Patient status: unconscious  Preoxygenation: BVM  Laryngoscope size: Mac 4  Tube size: 7.5 mm  Tube type: cuffed  Number of attempts: 3  Ventilation between attempts: BVM  Cricoid pressure: yes  Cords visualized: no  Post-procedure assessment: CO2 detector and chest rise  Breath sounds: rales/crackles and equal  Cuff inflated: yes  ETT to teeth: 25 cm  Tube secured with: ETT good  Chest x-ray interpreted by me.  Chest x-ray findings: endotracheal tube in appropriate position  Complications: No  Specimens: No  Implants: No  Comments: Attempt 1 was aborted 2/2 pt had anterior airway and could not pass bougie  Attempt 2 was aborted 2/2 pt had episode of emesis.   Attempt 3 was successful    Central Line  Date/Time: 5/27/2017 2:33 AM  Location procedure was performed: [facility] EMERGENCY DEPARTMENT  Performed by: GREGORY THAO  Consent Done: Emergent Situation  Indications: med administration and vascular access  Description of findings:  emergent situation, not performed under full sterile field   Preparation: skin prepped with ChloraPrep  Skin prep agent dried: skin prep agent completely dried prior to procedure  Sterile barriers: all five maximum sterile barriers used - cap, mask, sterile gown, sterile gloves, and large sterile sheet  Hand hygiene: hand hygiene performed prior to central venous catheter insertion  Location details: right internal jugular  Site selection rationale: emergent situation, cardiac arrest, need for access  Catheter type: triple lumen  Catheter size: 7 Fr  Ultrasound guidance: yes  Vessel Caliber: medium, patent, compressibility normal  Needle advanced into vessel with real time Ultrasound guidance.  Guidewire confirmed in vessel.  Sterile sheath used.  Manometry: No   Number of attempts: 2  Assessment: placement verified by x-ray  Complications: none  Specimens: No  Implants: No  Post-procedure: line sutured,  chlorhexidine patch,  sterile dressing applied and blood return through all ports  Complications: No    Arterial Line  Date/Time: 5/27/2017 2:37 AM  Performed by: GREGORY THAO  Authorized by: LAURA ALBERTS   Consent Done: Emergent Situation  Location: left femoral  Needle gauge: 20  Seldinger technique: Seldinger technique used  Number of attempts: 2  Post-procedure: line sutured and dressing applied  Patient tolerance: Patient tolerated the procedure well with no immediate complications  Comments: Placement resulted in venous cannulation confirmed by lack of convincing arterial waveform/unreliable pressure readings on monitor        Labs Reviewed   CBC W/ AUTO DIFFERENTIAL - Abnormal; Notable for the following:        Result Value    RBC 2.60 (*)     Hemoglobin 6.9 (*)     Hematocrit 24.1 (*)     MCH 26.5 (*)     MCHC 28.6 (*)     RDW 16.9 (*)     All other components within normal limits   CULTURE, BLOOD    Narrative:     Aerobic and anaerobic   CULTURE, BLOOD    Narrative:     Aerobic and anaerobic    APTT   B-TYPE NATRIURETIC PEPTIDE   COMPREHENSIVE METABOLIC PANEL   CORTISOL, RANDOM   LACTIC ACID, PLASMA   LIPASE   MAGNESIUM   PHOSPHORUS   PROTIME-INR   PROCALCITONIN   TROPONIN I   TSH   URINALYSIS, REFLEX TO URINE CULTURE   LACTIC ACID, PLASMA   HEMOGLOBIN A1C   TYPE & SCREEN     EKG Readings: (Independently Interpreted)    Post-arrest EKG showed paced rhythm, rate 82.          Medical Decision Making:   History:   Old Medical Records: I decided to obtain old medical records.  Initial Assessment:   I immediately checked carotid pulse which was not present and CPR was immediately begun. She was intubated successfully as confirmed by bilateral breath sounds, good color change on CO2 monitor, andd improvement in O2 saturation. She was a difficult intubation. She underwent greater than 30 minutes of resuscitation and had various rhythms including PEA and ventricular fibrillation. Resuscitative measures performed including central line placement, A line placement, numerous doses of epi, bicarb, dextrose, calcium, fluid resusciation, and amioderone. She was also given defibrillatory shocks twice for 2 episodes of v-fib on pulse checks. Ultimately she acheived ROSC. I confirmed cardiac motion with bedside US and confirmed R carotid pulse. A line showed hypotension after ROSC and she was started on levophed infusion. Sepsis protocol initiated. Post-arrest EKG showed paced rhythm, rate 82. ICU consulted and will admit. Pt reassessed numerous times given the critical nature of her illness.    I was present for all procedures and supervised the residents. Pt seen with residents but this documentation was completed by me.  Independently Interpreted Test(s):   I have ordered and independently interpreted EKG Reading(s) - see prior notes  Clinical Tests:   Lab Tests: Ordered and Reviewed  Radiological Study: Ordered and Reviewed  Medical Tests: Ordered and Reviewed  Other:   I have discussed this case with another health  care provider.            Scribe Attestation:   Scribe #1: I performed the above scribed service and the documentation accurately describes the services I performed. I attest to the accuracy of the note.    Attending Attestation:   Physician Attestation Statement for Resident:  As the supervising MD   Physician Attestation Statement: I have personally seen and examined this patient.   I agree with the above history. -:   As the supervising MD I agree with the above PE.    As the supervising MD I agree with the above treatment, course, plan, and disposition.  I was personally present during the entire procedure.        Attending Critical Care:   Critical Care Times:   ==============================================================  · Total Critical Care Time - exclusive of procedural time: 40 minutes.  ==============================================================  Critical care was necessary to treat or prevent imminent or life-threatening deterioration of the following conditions: cardiac arrest, sepsis and respiratory failure.     Physician Attestation for Scribe:  Physician Attestation Statement for Scribe #1: I, Dr. Rodarte , reviewed documentation, as scribed by Krista Suarez in my presence, and it is both accurate and complete.                 ED Course     Clinical Impression:   The primary encounter diagnosis was Cardiac arrest. Diagnoses of Acute respiratory failure, unspecified whether with hypoxia or hypercapnia and Sepsis, due to unspecified organism were also pertinent to this visit.    Disposition:   Disposition: Admitted (ICU)       Fermin Rodarte MD  05/29/17 9131

## 2017-05-27 NOTE — ASSESSMENT & PLAN NOTE
-most likely 2/2 to PNA, but other ddx include ARDS vs pulmonary edema  -pts daughter wished to make to DNR and comfort care was initiated  -patient was subsequently declared dead at 1012

## 2017-05-27 NOTE — PLAN OF CARE
Ms Cobos (patient's daughter) present in room.  She states that her mother wishes to be DNR and has expressed in the past her desire to not be intubated.  Daughter states that her mother would not want dialysis.  At this point,  daughter has communicated that her mother would want to have comfort care initiated as she has had a long gómez with various health conditions.   Dr. Ledesma in room during these discussions.  Dr. Ledesma informed daughter and family in the room of the process of withdrawal of care.  All questions were answered and patient's family stated that they were ready to withdrawal care.  In accordance with their wishes, all aggressive measures were stopped and patient is currently being transitioned to comfort care.     Jackie Leiva MD  Internal Medicine, PGY1  Pager [phone]

## 2017-05-27 NOTE — ASSESSMENT & PLAN NOTE
-K was 5.9 on admit and pt starting patient on insulin drip  -if K is not trending down, daughter was informed that pt will need to consider dialysis  -daughter expressed that mother would not want dialysis and comfort care was to be initiated

## 2017-05-27 NOTE — ASSESSMENT & PLAN NOTE
-PEA, may be 2/2 to hypoxemia vs hyperkalemia vs acidosis vs MI   -on exam, patient is withdrawing from pain, has reactive pupils and has a gag reflex  -spoke with pts Daughter, who expressed pts wishes to be made DNR;   -will get 2d ECHO in the AM; in mean time will attempt a bedside US of heart  -patient is currently on Levo and Amio infusion  -will f/u on pts labs

## 2017-05-27 NOTE — PLAN OF CARE
Reviewed Ms. Moore's prior history in EMR and spoke with her daughter Gertrude Cody ([phone]).     Briefly 60 y/o Female with Multiple co-morbidities including COPD (on home oxygen), CAD  S/p recent MISAEL placement (03/2017), CHF ( with recent Impella placement in 03/2017), PVD (s/p stenting and angioplasty recently), A-fib (on xarelto), s/p Pacemaker, DM (on insulin) who is transferred to our hospital for PNA and was found to be pulseless on arrival at the ED.     She underwent CPR for PEA arrest for ~30 mins per the ED staff. Now intubated and on pressors.     After several attempts. We were able to contact daughter - Gertrude (who is by default MPOA given only child and no active spouse) - who informs us that patient had previously expressed that she would want to be DNR and did not desire aggressive therapies. Hence, we have decided to avoid therapeutic hypothermia and after the discussion also decided against dialysis.     Daughter will try to be here early in the morning to be at patients bedside.     Helen Bunn  Pulmonary Critical Care fellow.   [facility].   Cell:[phone]

## 2017-05-27 NOTE — HPI
Ms. Moore is a 61 year old F with a PMHx of CAD (with stents in LAD, 3/2017), previous Empella placement, Afib (on Xerealto) COPD (on Breo and Spiriva, and Home Oxygen), T2DM (on insulin) who presents to [facility] ED with Cardiac Arrest.    Patient was previously admitted to [facility] from 3/20 - 4/10 for LLE cellulutis (R ankle).  She was started on Vanc and Zosyn, cultures grew MRSA so zosyn was stopped and patient was eventually transitioned to Doxycycline.  Patient also developed acute respiratory failure during this hospital stay, due to pulm edema.  She had a R thoracentesis (transudative) and was diuresed.  She also had an angiogram, which showed PVD.    We currently have no history between 4/10-present (unable to get in touch with family at the moment).  Patient was being transferred to [place] for management of pneumonia. On route, she became obtunded and subsequently developed PEA. She arrived at [facility] ED, at which time she was coded for 30 minutes, had 2 shocks, 6 rounds of Epi, 1 amiodorone, 1 dextrose, 1 bicarb and 1 calcium.  She had a L femoral venous catheter placed, R femoral triple lumen, IO L tibia and was intubated.  Patient was subsequently transferred to Critical Care medicine.

## 2017-05-27 NOTE — HOSPITAL COURSE
Transition to comfort care initiated. At the direction of the family, the patient was extubated  and measures to ensure the comfort of the patient including,  morphine as needed for pain and air hunger as well as benzodiazepines as needed for agitation. The patient was subsequently declared dead at 10:10

## 2017-05-27 NOTE — PROGRESS NOTES
Received patient from ER intubated with a 7.5 ET-Tube secured at the 25 cm adrianna, at the lips, placed on ventilator with previous settings from ER.

## 2017-05-27 NOTE — ED NOTES
While transporting patient to CT, right femoral line was accidentally removed.  ER MD notified, new line will be placed.

## 2017-05-27 NOTE — SUBJECTIVE & OBJECTIVE
Past Medical History:   Diagnosis Date    Anticoagulant long-term use     CHF (congestive heart failure)     COPD (chronic obstructive pulmonary disease)     Coronary artery disease     Diabetes mellitus     GERD (gastroesophageal reflux disease)     Hyperlipidemia     Hypertension     MI (myocardial infarction)     PAD (peripheral artery disease)     Stroke        Past Surgical History:   Procedure Laterality Date    CARDIAC PACEMAKER PLACEMENT      EYE SURGERY      TONSILLECTOMY      TUBAL LIGATION         Review of patient's allergies indicates:   Allergen Reactions    Adhesive tape-silicones     Celebrex [celecoxib]     Erythromycin     Statins-hmg-coa reductase inhibitors Other (See Comments)     Muscle weakness    Sulfa (sulfonamide antibiotics)     Symbicort [budesonide-formoterol] Nausea And Vomiting    Toradol [ketorolac]     Ultram [tramadol]        Family History     None        Social History Main Topics    Smoking status: Former Smoker    Smokeless tobacco: Not on file    Alcohol use No    Drug use: No    Sexual activity: Not on file      Review of Systems   Unable to perform ROS: Intubated     Objective:     Vital Signs (Most Recent):  Pulse: 67 (05/27/17 0137)  Resp: 11 (05/27/17 0137)  BP: (!) 101/46 (05/27/17 0146)  SpO2: 100 % (05/27/17 0137) Vital Signs (24h Range):  Pulse:  [40-86] 67  Resp:  [] 11  SpO2:  [95 %-100 %] 100 %  BP: (101-198)/(42-98) 101/46   Weight: 115 kg (253 lb 8.5 oz)  Body mass index is 46.37 kg/m².    No intake or output data in the 24 hours ending 05/27/17 0249    Physical Exam   Constitutional:   Patient intubated, with R femoral triple lumen, R femoral venous cath, IO L tibia in place.     HENT:   Head: Normocephalic and atraumatic.   Eyes: Pupils are equal, round, and reactive to light. Right eye exhibits no discharge. Left eye exhibits no discharge.   Neck: Normal range of motion. No JVD present.   Cardiovascular: Normal rate.  Exam  reveals no friction rub.    No murmur heard.  Pulmonary/Chest: She has no wheezes.   Patient intubated, on AC, Vt 500, PEEP 5. FiO2=40,   Abdominal: Soft. She exhibits no distension and no mass.   Musculoskeletal: She exhibits edema. She exhibits no deformity.   IO in place L tibia   Neurological:   Patient is intubated without sedation.  Patient withdrew to pain, had pupillary response and possible positive tracheal reflex.    Skin: No rash noted.   ecchymosis throughout abdomen       Vents:  Vent Mode: A/C (05/27/17 0112)  Ventilator Initiated: Yes (05/27/17 0112)  Set Rate: 20 bmp (05/27/17 0112)  Vt Set: 500 mL (05/27/17 0112)  Pressure Support: 0 cmH20 (05/27/17 0112)  PEEP/CPAP: 5 cmH20 (05/27/17 0112)  Oxygen Concentration (%): 100 (05/27/17 0112)  Peak Airway Pressure: 46 cmH2O (05/27/17 0112)  Total Ve: 11.7 mL (05/27/17 0112)  Lines/Drains/Airways     Airway                 Airway - Non-Surgical 05/27/17 0030 Endotracheal Tube less than 1 day         Airway - Non-Surgical 05/27/17 0034 Endotracheal Tube less than 1 day          Peripheral Intravenous Line                 Peripheral IV - Single Lumen 04/09/17 1815 Left Forearm 47 days              Significant Labs:    CBC/Anemia Profile:    Recent Labs  Lab 05/27/17 0141   WBC 29.06*   HGB 6.9*   HCT 24.1*      MCV 93   RDW 16.9*        Chemistries:    Recent Labs  Lab 05/27/17 0141      K 5.9*   CL 96   CO2 11*   BUN 55*   CREATININE 2.8*   CALCIUM 8.3*   ALBUMIN 1.9*   PROT 5.4*   BILITOT 0.7   ALKPHOS 276*   ALT 1,999*   AST 1,604*   MG 1.9   PHOS 9.9*       ABGs: No results for input(s): PH, PCO2, HCO3, POCSATURATED, BE in the last 48 hours.  Lactic Acid:   Recent Labs  Lab 05/27/17 0141   LACTATE >12.0*       Significant Imaging: CXR: I have reviewed all pertinent results/findings within the past 24 hours and my personal findings are:  Diffuse hazy airspace opacities and prominent pulmonary vasculature suggestive of pulmonary edema,  pneumonia, or ARDS.  CT head is pending

## 2017-05-28 LAB
BACTERIA UR CULT: NORMAL
CORTIS SERPL-MCNC: 30.8 UG/DL

## 2017-05-31 LAB
POCT GLUCOSE: 362 MG/DL (ref 70–110)
POCT GLUCOSE: 431 MG/DL (ref 70–110)
POCT GLUCOSE: 433 MG/DL (ref 70–110)
POCT GLUCOSE: >500 MG/DL (ref 70–110)

## 2017-06-01 LAB
BACTERIA BLD CULT: NORMAL
BACTERIA BLD CULT: NORMAL